# Patient Record
Sex: FEMALE | Race: WHITE | NOT HISPANIC OR LATINO | URBAN - METROPOLITAN AREA
[De-identification: names, ages, dates, MRNs, and addresses within clinical notes are randomized per-mention and may not be internally consistent; named-entity substitution may affect disease eponyms.]

---

## 2017-06-07 ENCOUNTER — FOLLOW UP ESTABLISHED (OUTPATIENT)
Dept: URBAN - METROPOLITAN AREA CLINIC 64 | Facility: CLINIC | Age: 72
End: 2017-06-07
Payer: COMMERCIAL

## 2017-06-07 DIAGNOSIS — H16.223 KERATOCONJUNCTIVITIS SICCA, BILATERAL: ICD-10-CM

## 2017-06-07 DIAGNOSIS — H52.221 REGULAR ASTIGMATISM, RIGHT EYE: Primary | ICD-10-CM

## 2017-06-07 PROCEDURE — 92014 COMPRE OPH EXAM EST PT 1/>: CPT | Performed by: OPTOMETRIST

## 2017-06-07 PROCEDURE — 92015 DETERMINE REFRACTIVE STATE: CPT | Performed by: OPTOMETRIST

## 2017-06-07 ASSESSMENT — VISUAL ACUITY
OS: 20/20
OD: 20/20

## 2017-06-07 ASSESSMENT — INTRAOCULAR PRESSURE
OD: 11
OS: 12

## 2017-06-07 ASSESSMENT — KERATOMETRY
OD: 43.47
OS: 43.08

## 2018-06-11 ENCOUNTER — FOLLOW UP ESTABLISHED (OUTPATIENT)
Dept: URBAN - METROPOLITAN AREA CLINIC 64 | Facility: CLINIC | Age: 73
End: 2018-06-11
Payer: COMMERCIAL

## 2018-06-11 DIAGNOSIS — H52.222 REGULAR ASTIGMATISM, LEFT EYE: Primary | ICD-10-CM

## 2018-06-11 PROCEDURE — 92014 COMPRE OPH EXAM EST PT 1/>: CPT | Performed by: OPTOMETRIST

## 2018-06-11 PROCEDURE — 92015 DETERMINE REFRACTIVE STATE: CPT | Performed by: OPTOMETRIST

## 2018-06-11 ASSESSMENT — KERATOMETRY
OS: 43.03
OD: 43.24

## 2018-06-11 ASSESSMENT — VISUAL ACUITY
OS: 20/20
OD: 20/20

## 2018-06-11 ASSESSMENT — INTRAOCULAR PRESSURE
OS: 11
OD: 12

## 2019-06-17 ENCOUNTER — FOLLOW UP ESTABLISHED (OUTPATIENT)
Dept: URBAN - METROPOLITAN AREA CLINIC 64 | Facility: CLINIC | Age: 74
End: 2019-06-17
Payer: COMMERCIAL

## 2019-06-17 PROCEDURE — 92014 COMPRE OPH EXAM EST PT 1/>: CPT | Performed by: OPTOMETRIST

## 2019-06-17 PROCEDURE — 92015 DETERMINE REFRACTIVE STATE: CPT | Performed by: OPTOMETRIST

## 2019-06-17 ASSESSMENT — VISUAL ACUITY
OD: 20/20
OS: 20/20

## 2019-06-17 ASSESSMENT — INTRAOCULAR PRESSURE
OS: 14
OD: 15

## 2019-06-17 ASSESSMENT — KERATOMETRY
OS: 43.05
OD: 43.41

## 2020-07-08 ENCOUNTER — FOLLOW UP ESTABLISHED (OUTPATIENT)
Dept: URBAN - METROPOLITAN AREA CLINIC 64 | Facility: CLINIC | Age: 75
End: 2020-07-08
Payer: COMMERCIAL

## 2020-07-08 PROCEDURE — 92014 COMPRE OPH EXAM EST PT 1/>: CPT | Performed by: OPTOMETRIST

## 2020-07-08 PROCEDURE — 92015 DETERMINE REFRACTIVE STATE: CPT | Performed by: OPTOMETRIST

## 2020-07-08 ASSESSMENT — INTRAOCULAR PRESSURE
OD: 9
OS: 12

## 2020-07-08 ASSESSMENT — VISUAL ACUITY
OD: 20/20
OS: 20/20

## 2020-07-08 ASSESSMENT — KERATOMETRY
OS: 42.84
OD: 43.16

## 2021-08-25 ENCOUNTER — OFFICE VISIT (OUTPATIENT)
Dept: URBAN - METROPOLITAN AREA CLINIC 64 | Facility: CLINIC | Age: 76
End: 2021-08-25
Payer: COMMERCIAL

## 2021-08-25 DIAGNOSIS — H52.223 REGULAR ASTIGMATISM, BILATERAL: Primary | ICD-10-CM

## 2021-08-25 DIAGNOSIS — H25.813 COMBINED FORMS OF AGE-RELATED CATARACT, BILATERAL: ICD-10-CM

## 2021-08-25 DIAGNOSIS — H43.392 OTHER VITREOUS OPACITIES, LEFT EYE: ICD-10-CM

## 2021-08-25 PROCEDURE — 92014 COMPRE OPH EXAM EST PT 1/>: CPT | Performed by: OPTOMETRIST

## 2021-08-25 ASSESSMENT — KERATOMETRY
OD: 43.38
OS: 42.99

## 2021-08-25 ASSESSMENT — INTRAOCULAR PRESSURE
OS: 10
OD: 9

## 2021-08-25 ASSESSMENT — VISUAL ACUITY
OS: 20/20
OD: 20/20

## 2021-08-25 NOTE — IMPRESSION/PLAN
Impression: Regular astigmatism, bilateral Plan: Updated glasses Rx. She wears PALs occasionally when out and about. Otherwise she uses readers around the house.

## 2021-08-25 NOTE — IMPRESSION/PLAN
Impression: Combined forms of age-related cataract, bilateral Plan: Cataracts account for the patient's complaints. No treatment currently recommended. The patient will monitor vision changes and contact us with any decrease in vision.

## 2021-08-25 NOTE — IMPRESSION/PLAN
Impression: Other vitreous opacities, left eye: H43.392. Plan: Normal retinal health. Pt. ed. on reason for occasional blurred vision OS. OK to observe.

## 2022-08-25 ENCOUNTER — OFFICE VISIT (OUTPATIENT)
Dept: URBAN - METROPOLITAN AREA CLINIC 64 | Facility: CLINIC | Age: 77
End: 2022-08-25
Payer: COMMERCIAL

## 2022-08-25 DIAGNOSIS — H52.223 REGULAR ASTIGMATISM, BILATERAL: Primary | ICD-10-CM

## 2022-08-25 DIAGNOSIS — H25.813 COMBINED FORMS OF AGE-RELATED CATARACT, BILATERAL: ICD-10-CM

## 2022-08-25 PROCEDURE — 92014 COMPRE OPH EXAM EST PT 1/>: CPT | Performed by: OPTOMETRIST

## 2022-08-25 ASSESSMENT — INTRAOCULAR PRESSURE
OS: 13
OD: 12

## 2022-08-25 ASSESSMENT — VISUAL ACUITY
OD: 20/20
OS: 20/20

## 2022-08-25 ASSESSMENT — KERATOMETRY
OD: 42.67
OS: 42.94

## 2023-08-25 ENCOUNTER — OFFICE VISIT (OUTPATIENT)
Dept: URBAN - METROPOLITAN AREA CLINIC 64 | Facility: LOCATION | Age: 78
End: 2023-08-25
Payer: COMMERCIAL

## 2023-08-25 DIAGNOSIS — H52.223 REGULAR ASTIGMATISM, BILATERAL: Primary | ICD-10-CM

## 2023-08-25 PROCEDURE — 92014 COMPRE OPH EXAM EST PT 1/>: CPT | Performed by: OPTOMETRIST

## 2023-08-25 ASSESSMENT — KERATOMETRY
OS: 42.88
OD: 43.33

## 2023-08-25 ASSESSMENT — INTRAOCULAR PRESSURE
OD: 12
OS: 15

## 2024-08-26 ENCOUNTER — OFFICE VISIT (OUTPATIENT)
Dept: URBAN - METROPOLITAN AREA CLINIC 64 | Facility: LOCATION | Age: 79
End: 2024-08-26
Payer: MEDICARE

## 2024-08-26 DIAGNOSIS — H52.4 PRESBYOPIA: ICD-10-CM

## 2024-08-26 DIAGNOSIS — H25.813 COMBINED FORMS OF AGE-RELATED CATARACT, BILATERAL: Primary | ICD-10-CM

## 2024-08-26 PROCEDURE — 99214 OFFICE O/P EST MOD 30 MIN: CPT | Performed by: OPTOMETRIST

## 2024-08-26 ASSESSMENT — VISUAL ACUITY
OS: 20/20
OD: 20/20

## 2024-08-26 ASSESSMENT — INTRAOCULAR PRESSURE
OD: 14
OS: 16

## 2025-08-26 ENCOUNTER — OFFICE VISIT (OUTPATIENT)
Dept: URBAN - METROPOLITAN AREA CLINIC 64 | Facility: LOCATION | Age: 80
End: 2025-08-26
Payer: MEDICARE

## 2025-08-26 DIAGNOSIS — H16.223 KERATOCONJUNCTIVITIS SICCA, BILATERAL: ICD-10-CM

## 2025-08-26 DIAGNOSIS — H25.813 COMBINED FORMS OF AGE-RELATED CATARACT, BILATERAL: Primary | ICD-10-CM

## 2025-08-26 PROCEDURE — 92014 COMPRE OPH EXAM EST PT 1/>: CPT | Performed by: OPTOMETRIST

## 2025-08-26 ASSESSMENT — INTRAOCULAR PRESSURE
OD: 15
OS: 18